# Patient Record
Sex: FEMALE | Race: WHITE | NOT HISPANIC OR LATINO
[De-identification: names, ages, dates, MRNs, and addresses within clinical notes are randomized per-mention and may not be internally consistent; named-entity substitution may affect disease eponyms.]

---

## 2018-11-28 PROBLEM — R91.1 LUNG NODULE: Status: ACTIVE | Noted: 2018-11-28

## 2018-12-03 ENCOUNTER — APPOINTMENT (OUTPATIENT)
Dept: THORACIC SURGERY | Facility: CLINIC | Age: 48
End: 2018-12-03
Payer: COMMERCIAL

## 2018-12-03 DIAGNOSIS — R91.1 SOLITARY PULMONARY NODULE: ICD-10-CM

## 2018-12-17 ENCOUNTER — APPOINTMENT (OUTPATIENT)
Dept: THORACIC SURGERY | Facility: CLINIC | Age: 48
End: 2018-12-17
Payer: COMMERCIAL

## 2018-12-17 VITALS
DIASTOLIC BLOOD PRESSURE: 89 MMHG | OXYGEN SATURATION: 98 % | WEIGHT: 235 LBS | BODY MASS INDEX: 40.12 KG/M2 | HEIGHT: 64 IN | HEART RATE: 86 BPM | SYSTOLIC BLOOD PRESSURE: 141 MMHG | RESPIRATION RATE: 16 BRPM

## 2018-12-17 DIAGNOSIS — Z80.8 FAMILY HISTORY OF MALIGNANT NEOPLASM OF OTHER ORGANS OR SYSTEMS: ICD-10-CM

## 2018-12-17 DIAGNOSIS — Z80.1 FAMILY HISTORY OF MALIGNANT NEOPLASM OF TRACHEA, BRONCHUS AND LUNG: ICD-10-CM

## 2018-12-17 PROCEDURE — 99205 OFFICE O/P NEW HI 60 MIN: CPT

## 2018-12-17 RX ORDER — METFORMIN HYDROCHLORIDE 500 MG/1
500 TABLET, COATED ORAL
Refills: 0 | Status: ACTIVE | COMMUNITY

## 2018-12-27 ENCOUNTER — TRANSCRIPTION ENCOUNTER (OUTPATIENT)
Age: 48
End: 2018-12-27

## 2018-12-27 ENCOUNTER — APPOINTMENT (OUTPATIENT)
Dept: NUCLEAR MEDICINE | Facility: CLINIC | Age: 48
End: 2018-12-27
Payer: COMMERCIAL

## 2018-12-27 ENCOUNTER — OUTPATIENT (OUTPATIENT)
Dept: OUTPATIENT SERVICES | Facility: HOSPITAL | Age: 48
LOS: 1 days | End: 2018-12-27

## 2018-12-27 DIAGNOSIS — Z00.8 ENCOUNTER FOR OTHER GENERAL EXAMINATION: ICD-10-CM

## 2018-12-27 PROCEDURE — 78815 PET IMAGE W/CT SKULL-THIGH: CPT | Mod: 26,PI

## 2019-01-04 ENCOUNTER — OUTPATIENT (OUTPATIENT)
Dept: OUTPATIENT SERVICES | Facility: HOSPITAL | Age: 49
LOS: 1 days | End: 2019-01-04
Payer: COMMERCIAL

## 2019-01-04 VITALS
WEIGHT: 231.49 LBS | SYSTOLIC BLOOD PRESSURE: 146 MMHG | TEMPERATURE: 98 F | HEART RATE: 81 BPM | RESPIRATION RATE: 16 BRPM | DIASTOLIC BLOOD PRESSURE: 86 MMHG | HEIGHT: 64 IN

## 2019-01-04 DIAGNOSIS — Z29.9 ENCOUNTER FOR PROPHYLACTIC MEASURES, UNSPECIFIED: ICD-10-CM

## 2019-01-04 DIAGNOSIS — Z98.890 OTHER SPECIFIED POSTPROCEDURAL STATES: Chronic | ICD-10-CM

## 2019-01-04 DIAGNOSIS — R91.1 SOLITARY PULMONARY NODULE: ICD-10-CM

## 2019-01-04 DIAGNOSIS — E11.9 TYPE 2 DIABETES MELLITUS WITHOUT COMPLICATIONS: ICD-10-CM

## 2019-01-04 DIAGNOSIS — Z01.818 ENCOUNTER FOR OTHER PREPROCEDURAL EXAMINATION: ICD-10-CM

## 2019-01-04 DIAGNOSIS — Z13.89 ENCOUNTER FOR SCREENING FOR OTHER DISORDER: ICD-10-CM

## 2019-01-04 DIAGNOSIS — Z90.89 ACQUIRED ABSENCE OF OTHER ORGANS: Chronic | ICD-10-CM

## 2019-01-04 DIAGNOSIS — Z90.710 ACQUIRED ABSENCE OF BOTH CERVIX AND UTERUS: Chronic | ICD-10-CM

## 2019-01-04 LAB
ALBUMIN SERPL ELPH-MCNC: 4.5 G/DL — SIGNIFICANT CHANGE UP (ref 3.3–5.2)
ALP SERPL-CCNC: 114 U/L — SIGNIFICANT CHANGE UP (ref 40–120)
ALT FLD-CCNC: 34 U/L — HIGH
ANION GAP SERPL CALC-SCNC: 15 MMOL/L — SIGNIFICANT CHANGE UP (ref 5–17)
APTT BLD: 29.3 SEC — SIGNIFICANT CHANGE UP (ref 27.5–36.3)
AST SERPL-CCNC: 23 U/L — SIGNIFICANT CHANGE UP
BASOPHILS # BLD AUTO: 0 K/UL — SIGNIFICANT CHANGE UP (ref 0–0.2)
BASOPHILS NFR BLD AUTO: 0.4 % — SIGNIFICANT CHANGE UP (ref 0–2)
BILIRUB SERPL-MCNC: 0.2 MG/DL — LOW (ref 0.4–2)
BLD GP AB SCN SERPL QL: SIGNIFICANT CHANGE UP
BUN SERPL-MCNC: 11 MG/DL — SIGNIFICANT CHANGE UP (ref 8–20)
CALCIUM SERPL-MCNC: 9.2 MG/DL — SIGNIFICANT CHANGE UP (ref 8.6–10.2)
CHLORIDE SERPL-SCNC: 100 MMOL/L — SIGNIFICANT CHANGE UP (ref 98–107)
CO2 SERPL-SCNC: 25 MMOL/L — SIGNIFICANT CHANGE UP (ref 22–29)
CREAT SERPL-MCNC: 0.86 MG/DL — SIGNIFICANT CHANGE UP (ref 0.5–1.3)
EOSINOPHIL # BLD AUTO: 0.1 K/UL — SIGNIFICANT CHANGE UP (ref 0–0.5)
EOSINOPHIL NFR BLD AUTO: 0.9 % — SIGNIFICANT CHANGE UP (ref 0–6)
GLUCOSE SERPL-MCNC: 237 MG/DL — HIGH (ref 70–115)
HBA1C BLD-MCNC: 8.8 % — HIGH (ref 4–5.6)
HCT VFR BLD CALC: 39.4 % — SIGNIFICANT CHANGE UP (ref 37–47)
HGB BLD-MCNC: 12.6 G/DL — SIGNIFICANT CHANGE UP (ref 12–16)
INR BLD: 0.92 RATIO — SIGNIFICANT CHANGE UP (ref 0.88–1.16)
LYMPHOCYTES # BLD AUTO: 2.4 K/UL — SIGNIFICANT CHANGE UP (ref 1–4.8)
LYMPHOCYTES # BLD AUTO: 31 % — SIGNIFICANT CHANGE UP (ref 20–55)
MCHC RBC-ENTMCNC: 28 PG — SIGNIFICANT CHANGE UP (ref 27–31)
MCHC RBC-ENTMCNC: 32 G/DL — SIGNIFICANT CHANGE UP (ref 32–36)
MCV RBC AUTO: 87.6 FL — SIGNIFICANT CHANGE UP (ref 81–99)
MONOCYTES # BLD AUTO: 0.3 K/UL — SIGNIFICANT CHANGE UP (ref 0–0.8)
MONOCYTES NFR BLD AUTO: 4.1 % — SIGNIFICANT CHANGE UP (ref 3–10)
NEUTROPHILS # BLD AUTO: 5 K/UL — SIGNIFICANT CHANGE UP (ref 1.8–8)
NEUTROPHILS NFR BLD AUTO: 63.3 % — SIGNIFICANT CHANGE UP (ref 37–73)
PLATELET # BLD AUTO: 284 K/UL — SIGNIFICANT CHANGE UP (ref 150–400)
POTASSIUM SERPL-MCNC: 4 MMOL/L — SIGNIFICANT CHANGE UP (ref 3.5–5.3)
POTASSIUM SERPL-SCNC: 4 MMOL/L — SIGNIFICANT CHANGE UP (ref 3.5–5.3)
PROT SERPL-MCNC: 7.4 G/DL — SIGNIFICANT CHANGE UP (ref 6.6–8.7)
PROTHROM AB SERPL-ACNC: 10.5 SEC — SIGNIFICANT CHANGE UP (ref 10–12.9)
RBC # BLD: 4.5 M/UL — SIGNIFICANT CHANGE UP (ref 4.4–5.2)
RBC # FLD: 13.3 % — SIGNIFICANT CHANGE UP (ref 11–15.6)
SODIUM SERPL-SCNC: 140 MMOL/L — SIGNIFICANT CHANGE UP (ref 135–145)
TYPE + AB SCN PNL BLD: SIGNIFICANT CHANGE UP
WBC # BLD: 7.9 K/UL — SIGNIFICANT CHANGE UP (ref 4.8–10.8)
WBC # FLD AUTO: 7.9 K/UL — SIGNIFICANT CHANGE UP (ref 4.8–10.8)

## 2019-01-04 PROCEDURE — 93010 ELECTROCARDIOGRAM REPORT: CPT

## 2019-01-04 RX ORDER — SODIUM CHLORIDE 9 MG/ML
3 INJECTION INTRAMUSCULAR; INTRAVENOUS; SUBCUTANEOUS EVERY 8 HOURS
Qty: 0 | Refills: 0 | Status: DISCONTINUED | OUTPATIENT
Start: 2019-01-18 | End: 2019-01-19

## 2019-01-04 NOTE — H&P PST ADULT - HISTORY OF PRESENT ILLNESS
This is a 48 y.o female who presents to PST today.  The pt reports her mother passed away four years ago from lung and brain cancer. The pt requested to undergo routine screening.  She underwent routine x-ray which demonstrated a right lung nodule.  A CT and PET scan was performed and confirmed the diagnosis.  She is scheduled for a VATS in the near future.

## 2019-01-04 NOTE — H&P PST ADULT - FAMILY HISTORY
Mother  Still living? No  Family history of brain cancer, Age at diagnosis: Age Unknown  Family history of lung cancer, Age at diagnosis: Age Unknown     Father  Still living? No  Family history of myocardial infarction, Age at diagnosis: Age Unknown

## 2019-01-04 NOTE — H&P PST ADULT - NSANTHOSAYNRD_GEN_A_CORE
No. MANFRED screening performed.  STOP BANG Legend: 0-2 = LOW Risk; 3-4 = INTERMEDIATE Risk; 5-8 = HIGH Risk

## 2019-01-04 NOTE — H&P PST ADULT - ASSESSMENT
CAPRINI SCORE [CLOT]    AGE RELATED RISK FACTORS                                                       MOBILITY RELATED FACTORS  [    ] Age 41-60 years                                            (1 Point)                 [     ] Bed rest                                                        (1 Point)  [    ] Age: 61-74 years                                           (2 Points)               [    ] Plaster cast                                                   (2 Points)  [    ] Age= 75 years                                              (3 Points)                 [    ] Bed bound for more than 72 hours                 (2 Points)    DISEASE RELATED RISK FACTORS                                               GENDER SPECIFIC FACTORS  [    ] Edema in the lower extremities                       (1 Point)          [    ] Pregnancy                                                     (1 Point)  [    ] Varicose veins                                               (1 Point)                 [    ] Post-partum < 6 weeks                                   (1 Point)             [    ] BMI > 25 Kg/m2                                            (1 Point)                 [    ] Hormonal therapy  or oral contraception          (1 Point)                 [    ] Sepsis (in the previous month)                        (1 Point)           [    ] History of pregnancy complications                 (1 point)  [    ] Pneumonia or serious lung disease                                           [    ] Unexplained or recurrent                     (1 Point)           (in the previous month)                               (1 Point)  [    ] Abnormal pulmonary function test                     (1 Point)                 SURGERY RELATED RISK FACTORS  [    ] Acute myocardial infarction                              (1 Point)           [    ]  Section                                             (1 Point)  [    ] Congestive heart failure (in the previous month)  (1 Point)  [    ] Minor surgery                                                  (1 Point)   [    ] Inflammatory bowel disease                             (1 Point)           [    ] Arthroscopic surgery                                        (2 Points)  [    ] Central venous access                                      (2 Points)            [    ] General surgery lasting more than 45 minutes   (2 Points)       [    ] Stroke (in the previous month)                          (5 Points)        [    ] Elective arthroplasty                                         (5 Points)                                                                                                                                               HEMATOLOGY RELATED FACTORS                                                 TRAUMA RELATED RISK FACTORS  [    ] Prior episodes of VTE                                     (3 Points)               [    ] Fracture of the hip, pelvis, or leg                       (5 Points)  [    ] Positive family history for VTE                         (3 Points)           [    ] Acute spinal cord injury (in the previous month)  (5 Points)  [    ] Prothrombin 94370 A                                     (3 Points)              [    ] Paralysis  (less than 1 month)                             (5 Points)  [    ] Factor V Leiden                                             (3 Points)                 [    ] Multiple Trauma within 1 month                        (5 Points)  [    ] Lupus anticoagulants                                     (3 Points)                                                           [    ] Anticardiolipin antibodies                               (3 Points)                                                       [    ] High homocysteine in the blood                      (3 Points)                                             [ ] Other congenital or acquired thrombophilia      (3 Points)                                                [ ] Heparin induced thrombocytopenia                  (3 Points)                                          Total Score [          ]    OPIOID RISK TOOL    JANIS EACH BOX THAT APPLIES AND ADD TOTALS AT THE END    FAMILY HISTORY OF SUBSTANCE ABUSE                 FEMALE         MALE                                                Alcohol                            [    ] 1 pt         [     ] 3pts                                               Illegal Drugs                    [    ] 2 pts       [     ] 3pts                                               Rx Drugs                          [      ]4 pts      [     ] 4 pts    PERSONAL HISTORY OF SUBSTANCE ABUSE                                                                                          Alcohol                            [     ] 3 pts       [     ] 3 pts                                               Illegal Drugs                    [     ] 4 pts       [     ] 4 pts                                               Rx Drugs                          [     ] 5 pts       [     ] 5 pts    AGE BETWEEN 16-45 YEARS                                     [     ] 1 pt         [     ] 1 pt    HISTORY OF PREADOLESCENT   SEXUAL ABUSE                                                            [     ] 3 pts        [     ] 0pts    PSYCHOLOGICAL DISEASE                     ADD, OCD, Bipolar, Schizophrenia        [     ] 2 pts        [     ] 2 pts                      Depression                                               [     ] 1 pt          [     ] 1 pt           SCORING TOTAL   (add numbers and type here)              (      )                                     A score of 3 or lower indicated LOW risk for future opioid abuse  A score of 4 to 7 indicated moderate risk for future opioid abuse  A score of 8 or higher indicates a high risk for opioid abuse CAPRINI SCORE [CLOT]    AGE RELATED RISK FACTORS                                                       MOBILITY RELATED FACTORS  [  x  ] Age 41-60 years                                            (1 Point)                 [     ] Bed rest                                                        (1 Point)  [    ] Age: 61-74 years                                           (2 Points)               [    ] Plaster cast                                                   (2 Points)  [    ] Age= 75 years                                              (3 Points)                 [    ] Bed bound for more than 72 hours                 (2 Points)    DISEASE RELATED RISK FACTORS                                               GENDER SPECIFIC FACTORS  [    ] Edema in the lower extremities                       (1 Point)          [    ] Pregnancy                                                     (1 Point)  [    ] Varicose veins                                               (1 Point)                 [    ] Post-partum < 6 weeks                                   (1 Point)             [  x  ] BMI > 25 Kg/m2                                            (1 Point)                 [    ] Hormonal therapy  or oral contraception          (1 Point)                 [    ] Sepsis (in the previous month)                        (1 Point)           [    ] History of pregnancy complications                 (1 point)  [    ] Pneumonia or serious lung disease                                           [    ] Unexplained or recurrent                     (1 Point)           (in the previous month)                               (1 Point)  [    ] Abnormal pulmonary function test                     (1 Point)                 SURGERY RELATED RISK FACTORS  [    ] Acute myocardial infarction                              (1 Point)           [    ]  Section                                             (1 Point)  [    ] Congestive heart failure (in the previous month)  (1 Point)  [    ] Minor surgery                                                  (1 Point)   [    ] Inflammatory bowel disease                             (1 Point)           [    ] Arthroscopic surgery                                        (2 Points)  [    ] Central venous access                                      (2 Points)            [ x   ] General surgery lasting more than 45 minutes   (2 Points)       [    ] Stroke (in the previous month)                          (5 Points)        [    ] Elective arthroplasty                                         (5 Points)                                                                                                                                               HEMATOLOGY RELATED FACTORS                                                 TRAUMA RELATED RISK FACTORS  [    ] Prior episodes of VTE                                     (3 Points)               [    ] Fracture of the hip, pelvis, or leg                       (5 Points)  [    ] Positive family history for VTE                         (3 Points)           [    ] Acute spinal cord injury (in the previous month)  (5 Points)  [    ] Prothrombin 92561 A                                     (3 Points)              [    ] Paralysis  (less than 1 month)                             (5 Points)  [    ] Factor V Leiden                                             (3 Points)                 [    ] Multiple Trauma within 1 month                        (5 Points)  [    ] Lupus anticoagulants                                     (3 Points)                                                           [    ] Anticardiolipin antibodies                               (3 Points)                                                       [    ] High homocysteine in the blood                      (3 Points)                                             [ ] Other congenital or acquired thrombophilia      (3 Points)                                                [ ] Heparin induced thrombocytopenia                  (3 Points)                                          Total Score [      4    ]    OPIOID RISK TOOL    JANIS EACH BOX THAT APPLIES AND ADD TOTALS AT THE END    FAMILY HISTORY OF SUBSTANCE ABUSE                 FEMALE         MALE                                                Alcohol                            [    ] 1 pt         [     ] 3pts                                               Illegal Drugs                    [    ] 2 pts       [     ] 3pts                                               Rx Drugs                          [      ]4 pts      [     ] 4 pts    PERSONAL HISTORY OF SUBSTANCE ABUSE                                                                                          Alcohol                            [     ] 3 pts       [     ] 3 pts                                               Illegal Drugs                    [     ] 4 pts       [     ] 4 pts                                               Rx Drugs                          [     ] 5 pts       [     ] 5 pts    AGE BETWEEN 16-45 YEARS                                     [     ] 1 pt         [     ] 1 pt    HISTORY OF PREADOLESCENT   SEXUAL ABUSE                                                            [     ] 3 pts        [     ] 0pts    PSYCHOLOGICAL DISEASE                     ADD, OCD, Bipolar, Schizophrenia        [     ] 2 pts        [     ] 2 pts                      Depression                                               [     ] 1 pt          [     ] 1 pt           SCORING TOTAL   (add numbers and type here)              (   0   )                                     A score of 3 or lower indicated LOW risk for future opioid abuse  A score of 4 to 7 indicated moderate risk for future opioid abuse  A score of 8 or higher indicates a high risk for opioid abuse

## 2019-01-17 ENCOUNTER — TRANSCRIPTION ENCOUNTER (OUTPATIENT)
Age: 49
End: 2019-01-17

## 2019-01-18 ENCOUNTER — INPATIENT (INPATIENT)
Facility: HOSPITAL | Age: 49
LOS: 0 days | Discharge: HOME | DRG: 165 | End: 2019-01-19
Attending: THORACIC SURGERY (CARDIOTHORACIC VASCULAR SURGERY) | Admitting: THORACIC SURGERY (CARDIOTHORACIC VASCULAR SURGERY)
Payer: COMMERCIAL

## 2019-01-18 ENCOUNTER — RESULT REVIEW (OUTPATIENT)
Age: 49
End: 2019-01-18

## 2019-01-18 ENCOUNTER — APPOINTMENT (OUTPATIENT)
Dept: THORACIC SURGERY | Facility: HOSPITAL | Age: 49
End: 2019-01-18

## 2019-01-18 VITALS
SYSTOLIC BLOOD PRESSURE: 129 MMHG | OXYGEN SATURATION: 97 % | DIASTOLIC BLOOD PRESSURE: 73 MMHG | WEIGHT: 231.49 LBS | RESPIRATION RATE: 16 BRPM | HEART RATE: 80 BPM | HEIGHT: 64 IN | TEMPERATURE: 98 F

## 2019-01-18 DIAGNOSIS — Z90.710 ACQUIRED ABSENCE OF BOTH CERVIX AND UTERUS: Chronic | ICD-10-CM

## 2019-01-18 DIAGNOSIS — Z98.890 OTHER SPECIFIED POSTPROCEDURAL STATES: Chronic | ICD-10-CM

## 2019-01-18 DIAGNOSIS — Z90.89 ACQUIRED ABSENCE OF OTHER ORGANS: Chronic | ICD-10-CM

## 2019-01-18 DIAGNOSIS — R91.1 SOLITARY PULMONARY NODULE: ICD-10-CM

## 2019-01-18 LAB
ABO RH CONFIRMATION: SIGNIFICANT CHANGE UP
GLUCOSE BLDC GLUCOMTR-MCNC: 126 MG/DL — HIGH (ref 70–99)
GLUCOSE BLDC GLUCOMTR-MCNC: 215 MG/DL — HIGH (ref 70–99)
GLUCOSE BLDC GLUCOMTR-MCNC: 294 MG/DL — HIGH (ref 70–99)

## 2019-01-18 PROCEDURE — 80053 COMPREHEN METABOLIC PANEL: CPT

## 2019-01-18 PROCEDURE — G0463: CPT

## 2019-01-18 PROCEDURE — 85027 COMPLETE CBC AUTOMATED: CPT

## 2019-01-18 PROCEDURE — 88341 IMHCHEM/IMCYTCHM EA ADD ANTB: CPT | Mod: 26

## 2019-01-18 PROCEDURE — 86900 BLOOD TYPING SEROLOGIC ABO: CPT

## 2019-01-18 PROCEDURE — 36415 COLL VENOUS BLD VENIPUNCTURE: CPT

## 2019-01-18 PROCEDURE — 88309 TISSUE EXAM BY PATHOLOGIST: CPT | Mod: 26

## 2019-01-18 PROCEDURE — 86850 RBC ANTIBODY SCREEN: CPT

## 2019-01-18 PROCEDURE — 85730 THROMBOPLASTIN TIME PARTIAL: CPT

## 2019-01-18 PROCEDURE — 88305 TISSUE EXAM BY PATHOLOGIST: CPT | Mod: 26

## 2019-01-18 PROCEDURE — 32674 THORACOSCOPY LYMPH NODE EXC: CPT

## 2019-01-18 PROCEDURE — 86923 COMPATIBILITY TEST ELECTRIC: CPT

## 2019-01-18 PROCEDURE — 85610 PROTHROMBIN TIME: CPT

## 2019-01-18 PROCEDURE — 83036 HEMOGLOBIN GLYCOSYLATED A1C: CPT

## 2019-01-18 PROCEDURE — 86901 BLOOD TYPING SEROLOGIC RH(D): CPT

## 2019-01-18 PROCEDURE — 32663 THORACOSCOPY W/LOBECTOMY: CPT

## 2019-01-18 PROCEDURE — 93005 ELECTROCARDIOGRAM TRACING: CPT

## 2019-01-18 PROCEDURE — 88331 PATH CONSLTJ SURG 1 BLK 1SPC: CPT | Mod: 26

## 2019-01-18 PROCEDURE — 88342 IMHCHEM/IMCYTCHM 1ST ANTB: CPT | Mod: 26

## 2019-01-18 PROCEDURE — 71045 X-RAY EXAM CHEST 1 VIEW: CPT | Mod: 26

## 2019-01-18 RX ORDER — INSULIN LISPRO 100/ML
VIAL (ML) SUBCUTANEOUS
Qty: 0 | Refills: 0 | Status: DISCONTINUED | OUTPATIENT
Start: 2019-01-18 | End: 2019-01-19

## 2019-01-18 RX ORDER — DOCUSATE SODIUM 100 MG
100 CAPSULE ORAL THREE TIMES A DAY
Qty: 0 | Refills: 0 | Status: DISCONTINUED | OUTPATIENT
Start: 2019-01-18 | End: 2019-01-19

## 2019-01-18 RX ORDER — DEXTROSE 50 % IN WATER 50 %
25 SYRINGE (ML) INTRAVENOUS ONCE
Qty: 0 | Refills: 0 | Status: DISCONTINUED | OUTPATIENT
Start: 2019-01-18 | End: 2019-01-19

## 2019-01-18 RX ORDER — GLUCAGON INJECTION, SOLUTION 0.5 MG/.1ML
1 INJECTION, SOLUTION SUBCUTANEOUS ONCE
Qty: 0 | Refills: 0 | Status: DISCONTINUED | OUTPATIENT
Start: 2019-01-18 | End: 2019-01-19

## 2019-01-18 RX ORDER — IPRATROPIUM/ALBUTEROL SULFATE 18-103MCG
3 AEROSOL WITH ADAPTER (GRAM) INHALATION EVERY 6 HOURS
Qty: 0 | Refills: 0 | Status: DISCONTINUED | OUTPATIENT
Start: 2019-01-18 | End: 2019-01-19

## 2019-01-18 RX ORDER — GABAPENTIN 400 MG/1
100 CAPSULE ORAL AT BEDTIME
Qty: 0 | Refills: 0 | Status: DISCONTINUED | OUTPATIENT
Start: 2019-01-18 | End: 2019-01-19

## 2019-01-18 RX ORDER — CEFAZOLIN SODIUM 1 G
2000 VIAL (EA) INJECTION ONCE
Qty: 0 | Refills: 0 | Status: COMPLETED | OUTPATIENT
Start: 2019-01-18 | End: 2019-01-18

## 2019-01-18 RX ORDER — SODIUM CHLORIDE 9 MG/ML
1000 INJECTION, SOLUTION INTRAVENOUS
Qty: 0 | Refills: 0 | Status: DISCONTINUED | OUTPATIENT
Start: 2019-01-18 | End: 2019-01-18

## 2019-01-18 RX ORDER — FENTANYL CITRATE 50 UG/ML
30 INJECTION INTRAVENOUS
Qty: 0 | Refills: 0 | Status: DISCONTINUED | OUTPATIENT
Start: 2019-01-18 | End: 2019-01-19

## 2019-01-18 RX ORDER — DEXTROSE 50 % IN WATER 50 %
12.5 SYRINGE (ML) INTRAVENOUS ONCE
Qty: 0 | Refills: 0 | Status: DISCONTINUED | OUTPATIENT
Start: 2019-01-18 | End: 2019-01-19

## 2019-01-18 RX ORDER — DEXTROSE 50 % IN WATER 50 %
15 SYRINGE (ML) INTRAVENOUS ONCE
Qty: 0 | Refills: 0 | Status: DISCONTINUED | OUTPATIENT
Start: 2019-01-18 | End: 2019-01-19

## 2019-01-18 RX ORDER — SODIUM CHLORIDE 9 MG/ML
1000 INJECTION, SOLUTION INTRAVENOUS
Qty: 0 | Refills: 0 | Status: DISCONTINUED | OUTPATIENT
Start: 2019-01-18 | End: 2019-01-19

## 2019-01-18 RX ORDER — GABAPENTIN 400 MG/1
0 CAPSULE ORAL
Qty: 0 | Refills: 0 | COMMUNITY

## 2019-01-18 RX ORDER — ONDANSETRON 8 MG/1
4 TABLET, FILM COATED ORAL ONCE
Qty: 0 | Refills: 0 | Status: DISCONTINUED | OUTPATIENT
Start: 2019-01-18 | End: 2019-01-19

## 2019-01-18 RX ORDER — FENTANYL CITRATE 50 UG/ML
50 INJECTION INTRAVENOUS
Qty: 0 | Refills: 0 | Status: DISCONTINUED | OUTPATIENT
Start: 2019-01-18 | End: 2019-01-19

## 2019-01-18 RX ORDER — HYDROMORPHONE HYDROCHLORIDE 2 MG/ML
0.5 INJECTION INTRAMUSCULAR; INTRAVENOUS; SUBCUTANEOUS
Qty: 0 | Refills: 0 | Status: DISCONTINUED | OUTPATIENT
Start: 2019-01-18 | End: 2019-01-19

## 2019-01-18 RX ADMIN — Medication 100 MILLIGRAM(S): at 22:46

## 2019-01-18 RX ADMIN — FENTANYL CITRATE 50 MICROGRAM(S): 50 INJECTION INTRAVENOUS at 18:54

## 2019-01-18 RX ADMIN — Medication 100 MILLIGRAM(S): at 15:25

## 2019-01-18 RX ADMIN — SODIUM CHLORIDE 3 MILLILITER(S): 9 INJECTION INTRAMUSCULAR; INTRAVENOUS; SUBCUTANEOUS at 22:48

## 2019-01-18 RX ADMIN — FENTANYL CITRATE 50 MICROGRAM(S): 50 INJECTION INTRAVENOUS at 18:55

## 2019-01-18 RX ADMIN — FENTANYL CITRATE 30 MILLILITER(S): 50 INJECTION INTRAVENOUS at 19:13

## 2019-01-18 RX ADMIN — GABAPENTIN 100 MILLIGRAM(S): 400 CAPSULE ORAL at 22:47

## 2019-01-18 RX ADMIN — FENTANYL CITRATE 30 MILLILITER(S): 50 INJECTION INTRAVENOUS at 17:27

## 2019-01-18 NOTE — BRIEF OPERATIVE NOTE - PROCEDURE
<<-----Click on this checkbox to enter Procedure Lobectomy of lung using video-assisted thoracoscopic surgery (VATS) with thoracotomy if indicated  01/18/2019  right middle lung  Active  VWANG

## 2019-01-19 ENCOUNTER — TRANSCRIPTION ENCOUNTER (OUTPATIENT)
Age: 49
End: 2019-01-19

## 2019-01-19 VITALS
RESPIRATION RATE: 18 BRPM | DIASTOLIC BLOOD PRESSURE: 70 MMHG | SYSTOLIC BLOOD PRESSURE: 127 MMHG | TEMPERATURE: 98 F | HEART RATE: 87 BPM

## 2019-01-19 DIAGNOSIS — E11.9 TYPE 2 DIABETES MELLITUS WITHOUT COMPLICATIONS: ICD-10-CM

## 2019-01-19 LAB
ANION GAP SERPL CALC-SCNC: 18 MMOL/L — HIGH (ref 5–17)
BUN SERPL-MCNC: 12 MG/DL — SIGNIFICANT CHANGE UP (ref 8–20)
CALCIUM SERPL-MCNC: 8.4 MG/DL — LOW (ref 8.6–10.2)
CHLORIDE SERPL-SCNC: 101 MMOL/L — SIGNIFICANT CHANGE UP (ref 98–107)
CO2 SERPL-SCNC: 20 MMOL/L — LOW (ref 22–29)
CREAT SERPL-MCNC: 0.68 MG/DL — SIGNIFICANT CHANGE UP (ref 0.5–1.3)
EOSINOPHIL # BLD AUTO: 0 K/UL — SIGNIFICANT CHANGE UP (ref 0–0.5)
EOSINOPHIL NFR BLD AUTO: 0 % — SIGNIFICANT CHANGE UP (ref 0–6)
GLUCOSE BLDC GLUCOMTR-MCNC: 221 MG/DL — HIGH (ref 70–99)
GLUCOSE BLDC GLUCOMTR-MCNC: 296 MG/DL — HIGH (ref 70–99)
GLUCOSE SERPL-MCNC: 291 MG/DL — HIGH (ref 70–115)
HCT VFR BLD CALC: 37 % — SIGNIFICANT CHANGE UP (ref 37–47)
HGB BLD-MCNC: 12 G/DL — SIGNIFICANT CHANGE UP (ref 12–16)
LYMPHOCYTES # BLD AUTO: 1.2 K/UL — SIGNIFICANT CHANGE UP (ref 1–4.8)
LYMPHOCYTES # BLD AUTO: 8 % — LOW (ref 20–55)
MCHC RBC-ENTMCNC: 28.3 PG — SIGNIFICANT CHANGE UP (ref 27–31)
MCHC RBC-ENTMCNC: 32.4 G/DL — SIGNIFICANT CHANGE UP (ref 32–36)
MCV RBC AUTO: 87.3 FL — SIGNIFICANT CHANGE UP (ref 81–99)
MONOCYTES # BLD AUTO: 0.6 K/UL — SIGNIFICANT CHANGE UP (ref 0–0.8)
MONOCYTES NFR BLD AUTO: 4.4 % — SIGNIFICANT CHANGE UP (ref 3–10)
NEUTROPHILS # BLD AUTO: 12.5 K/UL — HIGH (ref 1.8–8)
NEUTROPHILS NFR BLD AUTO: 87 % — HIGH (ref 37–73)
PLATELET # BLD AUTO: 268 K/UL — SIGNIFICANT CHANGE UP (ref 150–400)
POTASSIUM SERPL-MCNC: 3.8 MMOL/L — SIGNIFICANT CHANGE UP (ref 3.5–5.3)
POTASSIUM SERPL-SCNC: 3.8 MMOL/L — SIGNIFICANT CHANGE UP (ref 3.5–5.3)
RBC # BLD: 4.24 M/UL — LOW (ref 4.4–5.2)
RBC # FLD: 13.2 % — SIGNIFICANT CHANGE UP (ref 11–15.6)
SODIUM SERPL-SCNC: 139 MMOL/L — SIGNIFICANT CHANGE UP (ref 135–145)
WBC # BLD: 14.4 K/UL — HIGH (ref 4.8–10.8)
WBC # FLD AUTO: 14.4 K/UL — HIGH (ref 4.8–10.8)

## 2019-01-19 PROCEDURE — 71045 X-RAY EXAM CHEST 1 VIEW: CPT

## 2019-01-19 PROCEDURE — 87070 CULTURE OTHR SPECIMN AEROBIC: CPT

## 2019-01-19 PROCEDURE — 87075 CULTR BACTERIA EXCEPT BLOOD: CPT

## 2019-01-19 PROCEDURE — 88305 TISSUE EXAM BY PATHOLOGIST: CPT

## 2019-01-19 PROCEDURE — 88309 TISSUE EXAM BY PATHOLOGIST: CPT

## 2019-01-19 PROCEDURE — 71045 X-RAY EXAM CHEST 1 VIEW: CPT | Mod: 26,76

## 2019-01-19 PROCEDURE — 82962 GLUCOSE BLOOD TEST: CPT

## 2019-01-19 PROCEDURE — 88331 PATH CONSLTJ SURG 1 BLK 1SPC: CPT

## 2019-01-19 PROCEDURE — 36415 COLL VENOUS BLD VENIPUNCTURE: CPT

## 2019-01-19 PROCEDURE — 99238 HOSP IP/OBS DSCHRG MGMT 30/<: CPT

## 2019-01-19 PROCEDURE — 88342 IMHCHEM/IMCYTCHM 1ST ANTB: CPT

## 2019-01-19 PROCEDURE — 85027 COMPLETE CBC AUTOMATED: CPT

## 2019-01-19 PROCEDURE — 88341 IMHCHEM/IMCYTCHM EA ADD ANTB: CPT

## 2019-01-19 PROCEDURE — 80048 BASIC METABOLIC PNL TOTAL CA: CPT

## 2019-01-19 RX ORDER — OXYCODONE AND ACETAMINOPHEN 5; 325 MG/1; MG/1
1 TABLET ORAL EVERY 6 HOURS
Qty: 0 | Refills: 0 | Status: DISCONTINUED | OUTPATIENT
Start: 2019-01-19 | End: 2019-01-19

## 2019-01-19 RX ORDER — METFORMIN HYDROCHLORIDE 850 MG/1
500 TABLET ORAL DAILY
Qty: 0 | Refills: 0 | Status: DISCONTINUED | OUTPATIENT
Start: 2019-01-19 | End: 2019-01-19

## 2019-01-19 RX ORDER — METFORMIN HYDROCHLORIDE 850 MG/1
1 TABLET ORAL
Qty: 0 | Refills: 0 | COMMUNITY
Start: 2019-01-19

## 2019-01-19 RX ORDER — DOCUSATE SODIUM 100 MG
1 CAPSULE ORAL
Qty: 0 | Refills: 0 | COMMUNITY
Start: 2019-01-19

## 2019-01-19 RX ORDER — METFORMIN HYDROCHLORIDE 850 MG/1
0 TABLET ORAL
Qty: 0 | Refills: 0 | COMMUNITY

## 2019-01-19 RX ORDER — METFORMIN HYDROCHLORIDE 850 MG/1
500 TABLET ORAL
Qty: 0 | Refills: 0 | Status: DISCONTINUED | OUTPATIENT
Start: 2019-01-19 | End: 2019-01-19

## 2019-01-19 RX ADMIN — SODIUM CHLORIDE 3 MILLILITER(S): 9 INJECTION INTRAMUSCULAR; INTRAVENOUS; SUBCUTANEOUS at 05:15

## 2019-01-19 RX ADMIN — FENTANYL CITRATE 30 MILLILITER(S): 50 INJECTION INTRAVENOUS at 00:09

## 2019-01-19 RX ADMIN — FENTANYL CITRATE 30 MILLILITER(S): 50 INJECTION INTRAVENOUS at 07:50

## 2019-01-19 RX ADMIN — Medication 100 MILLIGRAM(S): at 05:15

## 2019-01-19 RX ADMIN — Medication 2: at 08:58

## 2019-01-19 RX ADMIN — Medication 3: at 00:26

## 2019-01-19 NOTE — DISCHARGE NOTE ADULT - HOSPITAL COURSE
Patient is a 48y old  Female who presents with a chief complaint of "Right lung nodule removed" (04 Jan 2019 14:04).    On 1/18/19, patient underwent Right Middle Lobectomy of lung using video-assisted thoracoscopic surgery (VATS).    Postoperative course/issues: hyperglycemia (patient is a diabetic), O 2 requirement due to postoperative hypoxia now resolved.

## 2019-01-19 NOTE — DISCHARGE NOTE ADULT - MEDICATION SUMMARY - MEDICATIONS TO TAKE
I will START or STAY ON the medications listed below when I get home from the hospital:    oxycodone-acetaminophen 5 mg-325 mg oral tablet  -- 1-2 tab(s) by mouth every 6 hours, As needed, Moderate Pain (4 - 6) MDD:6  -- Indication: For pain    gabapentin 100 mg oral tablet  -- orally once a day (at bedtime)  -- Indication: For Neuropathy    metFORMIN 500 mg oral tablet  -- 1 tab(s) by mouth 2 times a day  -- Indication: For Diabetes    docusate sodium 100 mg oral capsule  -- 1 cap(s) by mouth 3 times a day  -- Indication: For Stool softener

## 2019-01-19 NOTE — PROGRESS NOTE ADULT - SUBJECTIVE AND OBJECTIVE BOX
Subjective: "I feel ok, I've been getting up and moving as much as possible" NAD lying in bed. Denies CP, SOB, NV    Vital Signs:  Vital Signs Last 24 Hrs  T(C): 36.8 (01-19-19 @ 05:11), Max: 37 (01-19-19 @ 00:05)  T(F): 98.2 (01-19-19 @ 05:11), Max: 98.6 (01-19-19 @ 00:05)  HR: 86 (01-19-19 @ 05:11) (75 - 89)  BP: 112/62 (01-19-19 @ 05:11) (112/62 - 132/84)  RR: 18 (01-19-19 @ 05:11) (16 - 22)  SpO2: 97% (01-19-19 @ 05:11) (92% - 97%) on RA (3 L NC overnight)    Telemetry/Alarms: SR 80    Relevant labs, radiology and Medications reviewed    Pertinent Physical Exam  General: obese/WD NAD  Neurology: A&Ox3, nonfocal, LANE x 4  Respiratory: CTA B/L  CV: RRR, S1S2, no murmurs, rubs or gallops  Abdominal: Soft, NT, ND +BS, Last BM preop + flatus  Extremities: No edema, + peripheral pulses  Incisions: right lateral lower chest tube site with mild bloody drainage, additional port site ~ 5th ICS and ant ax line 3 cm c/d/i  Tubes: R chest tube to water seal, no air leak > removed without issue    01-18 @ 07:01  -  01-19 @ 07:00  --------------------------------------------------------  IN:    Oral Fluid: 240 mL  Total IN: 240 mL    OUT:    Chest Tube: 38 mL  Total OUT: 38 mL    Total NET: 202 mL

## 2019-01-19 NOTE — DISCHARGE NOTE ADULT - MEDICATION SUMMARY - MEDICATIONS TO CHANGE
I will SWITCH the dose or number of times a day I take the medications listed below when I get home from the hospital:    metFORMIN 500 mg oral tablet  -- orally once a day (at bedtime)

## 2019-01-19 NOTE — DISCHARGE NOTE ADULT - PLAN OF CARE
Recover from surgery Leave dressing intact until tomorrow evening, reinforcing with tape if necessary (about 36 hours from chest tube removal). At that time you may remove the dressing and take a shower. Place clean gauze over wound if continual drainage. Call Dr. Gastelum's office at [phone] tomorrow or the next business day to make a followup appointment. No ointments or lotions on the incision. Take all medications as ordered. Continue a stool softerner as needed. Continue to use your incentive spirometer and increase your activity as tolerated. No baths or swimming. Call the office if you experience any fevers, shortness of breath, chest pain or excessive drainage from the incision, day or night. Go to the emergency room if any of these symptoms are severe. Take your medications as ordered and take a stool softener if needed with the narcotic medications. It is extremely important to follow a diabetic (consistent carbohydrates, LOW/NO ADDED SUGAR) diet and monitor your glucose (sugar) levels. You have an increased risk of complications, including wound infections, due to the diabetes.    1. Check your glucoses 3-4 times daily before meals and bedtime.   2. Keep a log of your glucose levels every day.   3. Make an appointment to meet with your primary care provider or endocrinologist within a week.   4. Take ALL of your medications as prescribed. Only hold medications for low glucose levels (< 80) or if you are symptomatic (dizzy, sweating, lightheaded).  5. Ideally, we would like all of the glucose levels to be between .     You may have been discharged on different medications than you were taking before. Your body reacts differently to the medications after surgery. Please continue to take the medications as prescribed and notify the office, nurse practitioner or your PCP if you have any concerns right away.

## 2019-01-19 NOTE — DISCHARGE NOTE ADULT - NS AS ACTIVITY OBS
Do not make important decisions/Do not drive or operate machinery/Walking-Outdoors allowed/Showering allowed/No Heavy lifting/straining/Walking-Indoors allowed/Stairs allowed

## 2019-01-19 NOTE — DISCHARGE NOTE ADULT - PATIENT PORTAL LINK FT
You can access the [id number] Patient Portal, offered by [facility], by registering with the following website: [email or web address]

## 2019-01-19 NOTE — DISCHARGE NOTE ADULT - ADDITIONAL INSTRUCTIONS
Please call the Cardiothoracic Surgery office at [phone] if you are experiencing any shortness of breath, chest pain, fevers or chills, drainage from the incisions, persistent nausea, vomiting or if you have any questions about your medications. If the symptoms are severe, call 911 and go to the nearest hospital. You can also call [phone] for an emergency [facility] ambulance, which will take you to the closest [facility].    If you need any assistance for making any appointments for a new consult or referral in any specialty, please call our [facility] Clinical Coordination Center at [phone].

## 2019-01-19 NOTE — DISCHARGE NOTE ADULT - CARE PROVIDER_API CALL
Jomar Gastelum), Surgery; Thoracic Surgery  [address] [place] [address]  Phone: [phone]  Fax: [phone]

## 2019-01-19 NOTE — PROGRESS NOTE ADULT - ASSESSMENT
Assessment  Patient is a 48y old  Female who presents with a chief complaint of "Right lung nodule removed" (04 Jan 2019 14:04).    On 1/18/19, patient underwent Right Middle Lobectomy of lung using video-assisted thoracoscopic surgery (VATS).    Postoperative course/issues: hyperglycemia (patient is a diabetic), O 2 requirement due to postoperative hypoxia now resolved.    PLAN  Neuro: Pain management, Fentanyl PCA until tubes removed  Pulm: Encourage coughing, deep breathing and use of incentive spirometry. Wean off supplemental oxygen. Daily CXR (done, stable)  Cardio: Monitor telemetry/alarms  GI: Tolerating diet. Continue stool softeners.  Renal: Daily BMP, supplement electrolytes as needed  Vasc: Lovenox/SCDs for DVT prophylaxis  Heme: Stable H/H. Trend CBC daily.   ID: Off antibiotics. Stable.  Therapy: OOB/ambulate  Tubes: Removed without issue, no ptx on chest xray.  Disposition: Aim to D/C to home today  Discussed with Cardiothoracic Team at AM rounds.

## 2019-01-22 PROBLEM — Z91.89 OTHER SPECIFIED PERSONAL RISK FACTORS, NOT ELSEWHERE CLASSIFIED: Chronic | Status: ACTIVE | Noted: 2019-01-04

## 2019-01-22 PROBLEM — G62.9 POLYNEUROPATHY, UNSPECIFIED: Chronic | Status: ACTIVE | Noted: 2019-01-04

## 2019-01-22 PROBLEM — E11.9 TYPE 2 DIABETES MELLITUS WITHOUT COMPLICATIONS: Chronic | Status: ACTIVE | Noted: 2019-01-04

## 2019-01-22 LAB
GRAM STN FLD: SIGNIFICANT CHANGE UP
SPECIMEN SOURCE: SIGNIFICANT CHANGE UP

## 2019-01-27 LAB
CULTURE RESULTS: SIGNIFICANT CHANGE UP
SPECIMEN SOURCE: SIGNIFICANT CHANGE UP

## 2019-01-28 ENCOUNTER — APPOINTMENT (OUTPATIENT)
Age: 49
End: 2019-01-28
Payer: COMMERCIAL

## 2019-01-29 LAB — SURGICAL PATHOLOGY STUDY: SIGNIFICANT CHANGE UP

## 2019-01-31 ENCOUNTER — OUTPATIENT (OUTPATIENT)
Dept: OUTPATIENT SERVICES | Facility: HOSPITAL | Age: 49
LOS: 1 days | End: 2019-01-31
Payer: COMMERCIAL

## 2019-01-31 ENCOUNTER — APPOINTMENT (OUTPATIENT)
Age: 49
End: 2019-01-31
Payer: COMMERCIAL

## 2019-01-31 VITALS
OXYGEN SATURATION: 97 % | HEART RATE: 79 BPM | DIASTOLIC BLOOD PRESSURE: 93 MMHG | BODY MASS INDEX: 40.12 KG/M2 | HEIGHT: 64 IN | SYSTOLIC BLOOD PRESSURE: 151 MMHG | RESPIRATION RATE: 18 BRPM | WEIGHT: 235 LBS

## 2019-01-31 DIAGNOSIS — Z98.890 OTHER SPECIFIED POSTPROCEDURAL STATES: Chronic | ICD-10-CM

## 2019-01-31 DIAGNOSIS — Z90.89 ACQUIRED ABSENCE OF OTHER ORGANS: Chronic | ICD-10-CM

## 2019-01-31 DIAGNOSIS — Z90.710 ACQUIRED ABSENCE OF BOTH CERVIX AND UTERUS: Chronic | ICD-10-CM

## 2019-01-31 DIAGNOSIS — R91.1 SOLITARY PULMONARY NODULE: ICD-10-CM

## 2019-01-31 PROCEDURE — 71046 X-RAY EXAM CHEST 2 VIEWS: CPT | Mod: 26

## 2019-01-31 PROCEDURE — 99024 POSTOP FOLLOW-UP VISIT: CPT

## 2019-01-31 PROCEDURE — 71046 X-RAY EXAM CHEST 2 VIEWS: CPT

## 2019-01-31 NOTE — PHYSICAL EXAM
[Neck Appearance] : the appearance of the neck was normal [Neck Cervical Mass (___cm)] : no neck mass was observed [Jugular Venous Distention Increased] : there was no jugular-venous distention [Thyroid Diffuse Enlargement] : the thyroid was not enlarged [Thyroid Nodule] : there were no palpable thyroid nodules [] : no respiratory distress [Auscultation Breath Sounds / Voice Sounds] : lungs were clear to auscultation bilaterally [Heart Rate And Rhythm] : heart rate was normal and rhythm regular [Heart Sounds] : normal S1 and S2 [Heart Sounds Gallop] : no gallops [Murmurs] : no murmurs [Heart Sounds Pericardial Friction Rub] : no pericardial rub [FreeTextEntry1] : Wounds are healing well

## 2019-01-31 NOTE — HISTORY OF PRESENT ILLNESS
[FreeTextEntry1] : \giulia Boone was in the office today for her first postoperative visit. 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 She has done well and has no specific complaints.

## 2019-01-31 NOTE — ASSESSMENT
[FreeTextEntry1] : Mely is a 48-year-old female with a recent resection of a typical carcinoid who is recovering well. Have recommended an oncology evaluation for completeness but I suspect no additional treatment will be necessary. I would like her to continue surveillance in the future with her next CT scan in 6 months time.\par \[facility] Thank you for allowing me to participate in the care of your patient.\par \par Jomar Gastelum MD\[facility] Department of Cardiovascular and Thoracic Surgery\par \par [employer]io\[facility] [employer] [facility]\par

## 2019-12-03 NOTE — DISCHARGE NOTE ADULT - CARE PLAN
Endocarditis h/o IVDU Principal Discharge DX:	Solitary pulmonary nodule  Goal:	Recover from surgery  Assessment and plan of treatment:	Leave dressing intact until tomorrow evening, reinforcing with tape if necessary (about 36 hours from chest tube removal). At that time you may remove the dressing and take a shower. Place clean gauze over wound if continual drainage. Call Dr. Gastelum's office at [phone] tomorrow or the next business day to make a followup appointment. No ointments or lotions on the incision. Take all medications as ordered. Continue a stool softerner as needed. Continue to use your incentive spirometer and increase your activity as tolerated. No baths or swimming. Call the office if you experience any fevers, shortness of breath, chest pain or excessive drainage from the incision, day or night. Go to the emergency room if any of these symptoms are severe. Take your medications as ordered and take a stool softener if needed with the narcotic medications.  Secondary Diagnosis:	Type 2 diabetes mellitus without complication, without long-term current use of insulin  Assessment and plan of treatment:	It is extremely important to follow a diabetic (consistent carbohydrates, LOW/NO ADDED SUGAR) diet and monitor your glucose (sugar) levels. You have an increased risk of complications, including wound infections, due to the diabetes.    1. Check your glucoses 3-4 times daily before meals and bedtime.   2. Keep a log of your glucose levels every day.   3. Make an appointment to meet with your primary care provider or endocrinologist within a week.   4. Take ALL of your medications as prescribed. Only hold medications for low glucose levels (< 80) or if you are symptomatic (dizzy, sweating, lightheaded).  5. Ideally, we would like all of the glucose levels to be between .     You may have been discharged on different medications than you were taking before. Your body reacts differently to the medications after surgery. Please continue to take the medications as prescribed and notify the office, nurse practitioner or your PCP if you have any concerns right away.

## 2022-05-25 ENCOUNTER — APPOINTMENT (OUTPATIENT)
Dept: UROLOGY | Facility: CLINIC | Age: 52
End: 2022-05-25
Payer: COMMERCIAL

## 2022-05-25 PROCEDURE — 99448 NTRPROF PH1/NTRNET/EHR 21-30: CPT

## 2022-05-25 NOTE — HISTORY OF PRESENT ILLNESS
[FreeTextEntry1] : Telephone visit conversation:\par 51 female, history of crohn's disease, referred by DR Macedo\par scheduled for ileocecetomy robotic vs open\par has significant inflammation per note due to Crohns\par Surg hx: has hx of hysterectomy, tonsillectomy, and segmental lobectomy lung\par On Aspirin \par \par Needed for bilateral ureterals tent insertion

## 2022-05-25 NOTE — ASSESSMENT
[FreeTextEntry1] : 51 female, history of crohn's disease, referred by DR Macedo\par scheduled for ileocecetomy robotic vs open\par has significant inflammation per note due to Crohns \par \par plan\par cystoscopy, bilateral ureteral stent insertion\par \par The risks and benefits of surgery were discussed in detail with the patient. I will defer to Dr Macedo to either decide to keep stents or remove them together, or remove them consecutively after the procedure.\par CT reports were reviewed and explained thoroughly\par All the patient's questions were answered to the best of my ability.\par \par

## 2022-06-06 ENCOUNTER — OUTPATIENT (OUTPATIENT)
Dept: OUTPATIENT SERVICES | Facility: HOSPITAL | Age: 52
LOS: 1 days | End: 2022-06-06
Payer: COMMERCIAL

## 2022-06-06 DIAGNOSIS — Z98.890 OTHER SPECIFIED POSTPROCEDURAL STATES: Chronic | ICD-10-CM

## 2022-06-06 DIAGNOSIS — Z90.710 ACQUIRED ABSENCE OF BOTH CERVIX AND UTERUS: Chronic | ICD-10-CM

## 2022-06-06 DIAGNOSIS — Z90.89 ACQUIRED ABSENCE OF OTHER ORGANS: Chronic | ICD-10-CM

## 2022-06-06 PROCEDURE — 93010 ELECTROCARDIOGRAM REPORT: CPT

## 2022-06-08 DIAGNOSIS — Z01.810 ENCOUNTER FOR PREPROCEDURAL CARDIOVASCULAR EXAMINATION: ICD-10-CM

## 2022-06-08 DIAGNOSIS — Z01.812 ENCOUNTER FOR PREPROCEDURAL LABORATORY EXAMINATION: ICD-10-CM

## 2022-06-08 DIAGNOSIS — K50.90 CROHN'S DISEASE, UNSPECIFIED, WITHOUT COMPLICATIONS: ICD-10-CM

## 2022-06-21 ENCOUNTER — INPATIENT (INPATIENT)
Facility: HOSPITAL | Age: 52
LOS: 1 days | Discharge: HOME | End: 2022-06-23
Attending: COLON & RECTAL SURGERY | Admitting: COLON & RECTAL SURGERY
Payer: COMMERCIAL

## 2022-06-21 ENCOUNTER — APPOINTMENT (OUTPATIENT)
Dept: UROLOGY | Facility: HOSPITAL | Age: 52
End: 2022-06-21

## 2022-06-21 DIAGNOSIS — Z90.89 ACQUIRED ABSENCE OF OTHER ORGANS: Chronic | ICD-10-CM

## 2022-06-21 DIAGNOSIS — Z90.710 ACQUIRED ABSENCE OF BOTH CERVIX AND UTERUS: Chronic | ICD-10-CM

## 2022-06-21 DIAGNOSIS — Z98.890 OTHER SPECIFIED POSTPROCEDURAL STATES: Chronic | ICD-10-CM

## 2022-06-21 PROCEDURE — XXXXX: CPT | Mod: 1L

## 2022-06-22 PROCEDURE — 52332 CYSTOSCOPY AND TREATMENT: CPT | Mod: RT,LT

## 2022-06-25 DIAGNOSIS — D3A.090 BENIGN CARCINOID TUMOR OF THE BRONCHUS AND LUNG: ICD-10-CM

## 2022-06-25 DIAGNOSIS — K50.90 CROHN'S DISEASE, UNSPECIFIED, WITHOUT COMPLICATIONS: ICD-10-CM

## 2022-06-25 DIAGNOSIS — Z90.710 ACQUIRED ABSENCE OF BOTH CERVIX AND UTERUS: ICD-10-CM

## 2022-06-25 DIAGNOSIS — Z79.84 LONG TERM (CURRENT) USE OF ORAL HYPOGLYCEMIC DRUGS: ICD-10-CM

## 2022-06-25 DIAGNOSIS — E11.9 TYPE 2 DIABETES MELLITUS WITHOUT COMPLICATIONS: ICD-10-CM

## 2022-06-25 DIAGNOSIS — K66.0 PERITONEAL ADHESIONS (POSTPROCEDURAL) (POSTINFECTION): ICD-10-CM

## 2022-06-25 DIAGNOSIS — Z79.4 LONG TERM (CURRENT) USE OF INSULIN: ICD-10-CM

## 2022-06-25 DIAGNOSIS — E78.5 HYPERLIPIDEMIA, UNSPECIFIED: ICD-10-CM

## 2023-05-25 NOTE — H&P PST ADULT - ENERGY EXPENDITURE (METS)
Greater than 4 mets Complex Repair And Split-Thickness Skin Graft Text: The defect edges were debeveled with a #15 scalpel blade.  The primary defect was closed partially with a complex linear closure.  Given the location of the defect, shape of the defect and the proximity to free margins a split thickness skin graft was deemed most appropriate to repair the remaining defect.  The graft was trimmed to fit the size of the remaining defect.  The graft was then placed in the primary defect, oriented appropriately, and sutured into place.

## 2024-06-28 ENCOUNTER — NON-APPOINTMENT (OUTPATIENT)
Age: 54
End: 2024-06-28

## 2024-08-05 ENCOUNTER — APPOINTMENT (OUTPATIENT)
Dept: OPHTHALMOLOGY | Facility: CLINIC | Age: 54
End: 2024-08-05

## 2024-08-05 ENCOUNTER — NON-APPOINTMENT (OUTPATIENT)
Age: 54
End: 2024-08-05

## 2024-08-05 NOTE — H&P PST ADULT - PROBLEM SELECTOR PROBLEM 2
DVT ppx - Lovenox (high risk for DVT given malignancy)  Diet - soft with thin liquids; nutrition consult pending for PEG tube feeding recs  Dispo - home once services are arranged to help with tube feeds  Code status - DNR/DNI Need for prophylactic measure Noted that the patient's blood pressure is normally 80's/50's - confirmed with patient and , and prior outpatient GI visit in [facility]  - monitor

## 2024-10-18 ENCOUNTER — NON-APPOINTMENT (OUTPATIENT)
Age: 54
End: 2024-10-18

## 2024-10-18 ENCOUNTER — APPOINTMENT (OUTPATIENT)
Dept: OPHTHALMOLOGY | Facility: CLINIC | Age: 54
End: 2024-10-18
Payer: COMMERCIAL

## 2024-10-18 PROCEDURE — 92004 COMPRE OPH EXAM NEW PT 1/>: CPT

## 2024-10-18 PROCEDURE — 92134 CPTRZ OPH DX IMG PST SGM RTA: CPT

## 2024-11-22 ENCOUNTER — NON-APPOINTMENT (OUTPATIENT)
Age: 54
End: 2024-11-22

## 2024-11-22 ENCOUNTER — APPOINTMENT (OUTPATIENT)
Dept: OPHTHALMOLOGY | Facility: CLINIC | Age: 54
End: 2024-11-22
Payer: COMMERCIAL

## 2024-11-22 PROCEDURE — 92014 COMPRE OPH EXAM EST PT 1/>: CPT

## 2024-11-22 PROCEDURE — ZZZZZ: CPT

## 2024-11-22 PROCEDURE — 92250 FUNDUS PHOTOGRAPHY W/I&R: CPT

## 2024-11-22 PROCEDURE — 92136 OPHTHALMIC BIOMETRY: CPT

## 2024-12-04 ENCOUNTER — APPOINTMENT (OUTPATIENT)
Dept: OPHTHALMOLOGY | Facility: HOSPITAL | Age: 54
End: 2024-12-04
Payer: COMMERCIAL

## 2024-12-04 ENCOUNTER — NON-APPOINTMENT (OUTPATIENT)
Age: 54
End: 2024-12-04

## 2024-12-04 PROCEDURE — 66984 XCAPSL CTRC RMVL W/O ECP: CPT | Mod: LT

## 2024-12-06 ENCOUNTER — APPOINTMENT (OUTPATIENT)
Dept: OPHTHALMOLOGY | Facility: CLINIC | Age: 54
End: 2024-12-06
Payer: COMMERCIAL

## 2024-12-06 ENCOUNTER — NON-APPOINTMENT (OUTPATIENT)
Age: 54
End: 2024-12-06

## 2024-12-06 PROCEDURE — 99024 POSTOP FOLLOW-UP VISIT: CPT

## 2024-12-27 ENCOUNTER — NON-APPOINTMENT (OUTPATIENT)
Age: 54
End: 2024-12-27

## 2024-12-27 ENCOUNTER — APPOINTMENT (OUTPATIENT)
Dept: OPHTHALMOLOGY | Facility: CLINIC | Age: 54
End: 2024-12-27
Payer: COMMERCIAL

## 2024-12-27 PROCEDURE — 99024 POSTOP FOLLOW-UP VISIT: CPT

## 2025-01-17 ENCOUNTER — APPOINTMENT (OUTPATIENT)
Dept: OPHTHALMOLOGY | Facility: CLINIC | Age: 55
End: 2025-01-17

## 2025-05-31 ENCOUNTER — NON-APPOINTMENT (OUTPATIENT)
Age: 55
End: 2025-05-31

## 2025-06-02 ENCOUNTER — APPOINTMENT (OUTPATIENT)
Dept: RHEUMATOLOGY | Facility: CLINIC | Age: 55
End: 2025-06-02
Payer: COMMERCIAL

## 2025-06-02 VITALS
BODY MASS INDEX: 37.73 KG/M2 | TEMPERATURE: 97.3 F | HEIGHT: 62 IN | OXYGEN SATURATION: 98 % | DIASTOLIC BLOOD PRESSURE: 94 MMHG | WEIGHT: 205 LBS | SYSTOLIC BLOOD PRESSURE: 156 MMHG | HEART RATE: 83 BPM

## 2025-06-02 DIAGNOSIS — Z82.49 FAMILY HISTORY OF ISCHEMIC HEART DISEASE AND OTHER DISEASES OF THE CIRCULATORY SYSTEM: ICD-10-CM

## 2025-06-02 DIAGNOSIS — Z79.899 OTHER LONG TERM (CURRENT) DRUG THERAPY: ICD-10-CM

## 2025-06-02 DIAGNOSIS — M25.50 PAIN IN UNSPECIFIED JOINT: ICD-10-CM

## 2025-06-02 DIAGNOSIS — M54.9 DORSALGIA, UNSPECIFIED: ICD-10-CM

## 2025-06-02 DIAGNOSIS — E11.9 TYPE 2 DIABETES MELLITUS W/OUT COMPLICATIONS: ICD-10-CM

## 2025-06-02 DIAGNOSIS — K50.90 CROHN'S DISEASE, UNSPECIFIED, W/OUT COMPLICATIONS: ICD-10-CM

## 2025-06-02 DIAGNOSIS — K50.919 CROHN'S DISEASE, UNSPECIFIED, WITH UNSPECIFIED COMPLICATIONS: ICD-10-CM

## 2025-06-02 PROCEDURE — 99204 OFFICE O/P NEW MOD 45 MIN: CPT

## 2025-06-02 PROCEDURE — G2211 COMPLEX E/M VISIT ADD ON: CPT | Mod: NC

## 2025-06-02 PROCEDURE — 36415 COLL VENOUS BLD VENIPUNCTURE: CPT

## 2025-06-02 RX ORDER — ERGOCALCIFEROL 1.25 MG/1
1.25 MG CAPSULE ORAL
Refills: 0 | Status: ACTIVE | COMMUNITY

## 2025-06-02 RX ORDER — TIRZEPATIDE 10 MG/.5ML
10 INJECTION, SOLUTION SUBCUTANEOUS
Refills: 0 | Status: ACTIVE | COMMUNITY

## 2025-06-02 RX ORDER — ROSUVASTATIN CALCIUM 5 MG/1
5 TABLET, FILM COATED ORAL
Refills: 0 | Status: ACTIVE | COMMUNITY

## 2025-06-03 LAB
ALBUMIN SERPL ELPH-MCNC: 4.7 G/DL
ALP BLD-CCNC: 112 U/L
ALT SERPL-CCNC: 35 U/L
ANION GAP SERPL CALC-SCNC: 18 MMOL/L
AST SERPL-CCNC: 27 U/L
BASOPHILS # BLD AUTO: 0.07 K/UL
BASOPHILS NFR BLD AUTO: 0.9 %
BILIRUB SERPL-MCNC: 0.3 MG/DL
BUN SERPL-MCNC: 18 MG/DL
CALCIUM SERPL-MCNC: 9.9 MG/DL
CHLORIDE SERPL-SCNC: 100 MMOL/L
CO2 SERPL-SCNC: 22 MMOL/L
CREAT SERPL-MCNC: 0.81 MG/DL
CRP SERPL-MCNC: 3 MG/L
EGFRCR SERPLBLD CKD-EPI 2021: 85 ML/MIN/1.73M2
EOSINOPHIL # BLD AUTO: 0.09 K/UL
EOSINOPHIL NFR BLD AUTO: 1.1 %
ERYTHROCYTE [SEDIMENTATION RATE] IN BLOOD BY WESTERGREN METHOD: 21 MM/HR
GLUCOSE SERPL-MCNC: 139 MG/DL
HCT VFR BLD CALC: 38.7 %
HGB BLD-MCNC: 12.3 G/DL
IMM GRANULOCYTES NFR BLD AUTO: 0.6 %
LYMPHOCYTES # BLD AUTO: 3.01 K/UL
LYMPHOCYTES NFR BLD AUTO: 36.6 %
MAN DIFF?: NORMAL
MCHC RBC-ENTMCNC: 27.5 PG
MCHC RBC-ENTMCNC: 31.8 G/DL
MCV RBC AUTO: 86.6 FL
MONOCYTES # BLD AUTO: 0.43 K/UL
MONOCYTES NFR BLD AUTO: 5.2 %
NEUTROPHILS # BLD AUTO: 4.57 K/UL
NEUTROPHILS NFR BLD AUTO: 55.6 %
PLATELET # BLD AUTO: 285 K/UL
POTASSIUM SERPL-SCNC: 4.5 MMOL/L
PROT SERPL-MCNC: 7.4 G/DL
RBC # BLD: 4.47 M/UL
RBC # FLD: 13.5 %
SODIUM SERPL-SCNC: 140 MMOL/L
WBC # FLD AUTO: 8.22 K/UL

## 2025-06-04 LAB
HAV IGM SER QL: NONREACTIVE
HBV CORE IGG+IGM SER QL: NONREACTIVE
HBV CORE IGM SER QL: NONREACTIVE
HBV SURFACE AG SER QL: NONREACTIVE
HCV AB SER QL: NONREACTIVE
HCV S/CO RATIO: 0.22 S/CO
M TB IFN-G BLD-IMP: NEGATIVE
QUANTIFERON TB PLUS MITOGEN MINUS NIL: 9.11 IU/ML
QUANTIFERON TB PLUS NIL: 0.02 IU/ML
QUANTIFERON TB PLUS TB1 MINUS NIL: 0 IU/ML
QUANTIFERON TB PLUS TB2 MINUS NIL: 0 IU/ML

## 2025-06-06 ENCOUNTER — APPOINTMENT (OUTPATIENT)
Dept: RADIOLOGY | Facility: CLINIC | Age: 55
End: 2025-06-06

## 2025-06-06 ENCOUNTER — APPOINTMENT (OUTPATIENT)
Dept: ULTRASOUND IMAGING | Facility: CLINIC | Age: 55
End: 2025-06-06
Payer: COMMERCIAL

## 2025-06-06 PROCEDURE — 72100 X-RAY EXAM L-S SPINE 2/3 VWS: CPT

## 2025-06-06 PROCEDURE — 73521 X-RAY EXAM HIPS BI 2 VIEWS: CPT

## 2025-06-06 PROCEDURE — 73562 X-RAY EXAM OF KNEE 3: CPT | Mod: 50

## 2025-06-06 PROCEDURE — 76881 US COMPL JOINT R-T W/IMG: CPT

## 2025-06-06 PROCEDURE — 73120 X-RAY EXAM OF HAND: CPT | Mod: 50

## 2025-06-10 ENCOUNTER — NON-APPOINTMENT (OUTPATIENT)
Age: 55
End: 2025-06-10

## 2025-06-10 LAB — 14-3-3 ETA AG SER IA-MCNC: <0.2 NG/ML

## 2025-09-04 ENCOUNTER — APPOINTMENT (OUTPATIENT)
Dept: RHEUMATOLOGY | Facility: CLINIC | Age: 55
End: 2025-09-04